# Patient Record
Sex: FEMALE | Race: WHITE
[De-identification: names, ages, dates, MRNs, and addresses within clinical notes are randomized per-mention and may not be internally consistent; named-entity substitution may affect disease eponyms.]

---

## 2020-02-24 ENCOUNTER — HOSPITAL ENCOUNTER (INPATIENT)
Dept: HOSPITAL 74 - FM/S | Age: 65
LOS: 2 days | Discharge: HOME HEALTH | DRG: 301 | End: 2020-02-26
Attending: ORTHOPAEDIC SURGERY | Admitting: ORTHOPAEDIC SURGERY
Payer: COMMERCIAL

## 2020-02-24 VITALS — BODY MASS INDEX: 22.8 KG/M2

## 2020-02-24 DIAGNOSIS — I10: ICD-10-CM

## 2020-02-24 DIAGNOSIS — M16.11: Primary | ICD-10-CM

## 2020-02-24 PROCEDURE — 8E0W0CZ ROBOTIC ASSISTED PROCEDURE OF TRUNK REGION, OPEN APPROACH: ICD-10-PCS | Performed by: ORTHOPAEDIC SURGERY

## 2020-02-24 PROCEDURE — 0SR90JZ REPLACEMENT OF RIGHT HIP JOINT WITH SYNTHETIC SUBSTITUTE, OPEN APPROACH: ICD-10-PCS | Performed by: ORTHOPAEDIC SURGERY

## 2020-02-24 RX ADMIN — KETOROLAC TROMETHAMINE SCH MG: 30 INJECTION INTRAMUSCULAR; INTRAVENOUS at 18:32

## 2020-02-24 RX ADMIN — ACETAMINOPHEN SCH MG: 325 TABLET ORAL at 21:28

## 2020-02-24 RX ADMIN — CEFAZOLIN SODIUM SCH MLS/HR: 2 SOLUTION INTRAVENOUS at 18:33

## 2020-02-24 RX ADMIN — KETOROLAC TROMETHAMINE SCH MG: 30 INJECTION INTRAMUSCULAR; INTRAVENOUS at 13:15

## 2020-02-24 RX ADMIN — DOCUSATE SODIUM,SENNOSIDES SCH TABLET: 50; 8.6 TABLET, FILM COATED ORAL at 21:28

## 2020-02-24 RX ADMIN — OXYCODONE HYDROCHLORIDE AND ACETAMINOPHEN SCH MG: 500 TABLET ORAL at 21:28

## 2020-02-24 RX ADMIN — CELECOXIB SCH MG: 200 CAPSULE ORAL at 21:29

## 2020-02-24 RX ADMIN — ACETAMINOPHEN SCH MG: 325 TABLET ORAL at 18:32

## 2020-02-24 RX ADMIN — ONDANSETRON PRN MG: 2 INJECTION INTRAMUSCULAR; INTRAVENOUS at 21:30

## 2020-02-24 RX ADMIN — GABAPENTIN SCH MG: 300 CAPSULE ORAL at 21:27

## 2020-02-24 NOTE — SPEC
DATE OF OPERATION:  02/24/2020

 

PREOPERATIVE DIAGNOSIS:  Right hip osteoarthritis.

 

POSTOPERATIVE DIAGNOSIS:  Right hip osteoarthritis.

 

PROCEDURE:  Right total hip replacement with MAKOplasty robotic navigation.

 

ATTENDING:  Mahendra Lemons MD 

 

ASSISTANT:  AGUEDA Schwartz 

 

ANESTHESIA:  Spinal plus sedation.

 

ESTIMATED BLOOD LOSS:  300 mL.

 

COMPLICATIONS:  None.

 

DISPOSITION:  The patient was transferred to the PACU in stable condition.

 

IMPLANTS USED:  Candy Accolade II size 4 femoral component, [place] Trident II

48-mm acetabular component with 25 and 20-mm acetabular screws, MDM bipolar head ball

and liner with inner +3-mm offset head ball. 

 

INDICATIONS:  This is a 64-year-old female who presented to the office complaining of

severe right hip pain.  She was seen and examined by Dr. Lemons and diagnosed with

severe right hip osteoarthritis.  The patient was initially treated conservatively

with injections, medications, and physical therapy but continued to have severe pain

and ambulatory dysfunction.  She was, therefore, indicated for a right total hip

replacement.  The risks, benefits, and alternatives to the procedure were explained

to the patient in great detail, and she elected to proceed with the surgery. 

 

DESCRIPTION OF PROCEDURE:  On the day of surgery, the patient was taken to the

operating room and placed on the OR table.  Spinal anesthesia was administered by the

anesthesiologist.  The patient was then positioned in the lateral decubitus position

on the table and all bony prominences were padded.  An axillary roll was placed.  The

operative hip was then prepped and draped in the usual sterile fashion and

intravenous antibiotics were given for infection prophylaxis.  A surgical time-out

was then performed with the team, and the patients identity, procedure, side,

availability of implants, and the administration of antibiotics were confirmed.  

 

An approximately 15-cm longitudinal incision was made through the skin centered on

the greater trochanter of the hip.  This dissection was carried down through the

subcutaneous tissues to the deep fascia.  This fascia was then incised and a Cobra

was placed around the inferior femoral neck.  Electrocautery was used to reflect the

anterior 40% of the gluteus medius and minimus starting at the musculotendinous

junction and leaving a cuff for closure.  This was reflected to reveal the capsule of

the hip joint.  An anterior capsulectomy was performed and the femoral head and neck

were visualized.  Grade 4 changes were noted diffusely throughout the joint.

 

At this point, three small stab incisions were made superior to the main incision

along the iliac crest.  Three self-drilling Steinmann pins were then placed and the

[employer] pelvic array was attached.   Reference points on the limb were then entered into

the robotic device and the limb length deficiency, offset, and femoral neck resection

level were then calculated by the software.  The hip was then dislocated with

traction and external rotation.  An oscillating saw was used to make the femoral neck

cut at the level previously templated, and the femoral head was removed. 

 

Attention was then turned to the acetabulum.  Retractors were then placed around the

acetabulum and the labrum was removed.  An acetabular checkpoint pin and the [employer]

software were used to register the contours of the acetabulum.  The acetabulum was

then reamed in a single stage to the preoperatively templated size using the [employer]

robotic arm.  The appropriately sized cup was then impacted and had solid fixation as

well as the preset inclination and version of 40 and 20 degrees, respectively.  A

polyethylene liner was then placed in the cup.

 

Attention was then turned back to the femur, which was externally rotated for

improved visualization. A femoral neck elevator was used to present the femoral neck

cut, a box osteotome was used to enter the femoral canal, and a canal finder was used

to go down the femoral shaft.  The Jamie broaches were used sequentially until the

optimal scratch fit was achieved.  This correlated with the preoperatively templated

size.  From here, several different offset head and neck configurations were tested

until excellent stability and length were obtained.  These measurements were

quantified using the [employer] software.

 

All trial components were then removed, the femur was copiously irrigated, and the

final components were placed.  After final implants were placed, a 3-minute dilute

Betadine soak was performed.  Following this, wound closure was begun.  Once wound

closure a completed and dressings were placed, a postoperative x-ray was taken

intraoperatively.  We noted on this x-ray that there was a foreign body present in

the soft tissue adjust to lesser trochanter of the hip.  Further analysis suggested

that this foreign body was a screw, and evaluation of the instrumentation used

revealed that a very small screw was missing from the JAMIE offset reamer handler and

that the other screws in the handle seemed to correspond to the screw seen on x-ray

in size and appearance.

 

Because there was a foreign body present and because the patient had not yet been

taken off the OR table and sent to the PACU, we elected to immediately reopen the

wound and retrieve the screw.  The patient was placed back in the lateral decubitus

position, and the right hip was prepped and draped in the usual sterile fashion after

dressings were removed.  From here, the previous closure was opened, and we opened

all layers down to the level of the initial acetabular prep.  From here, a C-arm was

used to localize the screw, and it was determined that it was in the vicinity of the

lesser trochanter and adductor musculature medial to the femur.  A finger sweep was

performed in this area, and we were able to easily retrieve the screw on the first

try.  The screw was then sent to be cleaned so that it could be returned to the

 for a full investigation.

 

Once the screw was retrieved, we again used the C-arm to take radiographs of the

right hip, and it was confirmed that no foreign bodies were still present.  The wound

was then thoroughly irrigated with normal saline via pulsatile lavage.  The saline

contained 3 g of Ancef in a 3-L bag.  Once this was completed, wound closure was

begun as per the earlier dictation.

 

Leg length and stability were checked again and found to be excellent.  Irrigation

was performed again.  Wound closure was started by repairing the abductor muscles

with a no. 2 FiberWire stitch in a Krackow configuration passed through bone tunnels

in the greater trochanter and tied over a bony bridge.  This repair was then

reinforced with a 0 V-Loc 180 barbed suture.  Next, no. 1 Polysorb and 0 V-Loc 180

were used to close the fascia.  The deep subcutaneous tissue was closed with no. 1

Polysorb sutures, and 2-0 Polysorb was used for the superficial subcutaneous tissue. 

The skin was closed using both 3-0 V-Loc 90 suture in a running subcuticular fashion

and SwiftSet skin adhesive.   The Jamie array and pins were removed from the iliac

crest and the stab incision sites were irrigated and closed with 4-0 Polysorb sutures

and SwiftSet skin adhesive.  Once this was completed, a sterile dressing was applied.

 The patient was then awakened and taken to the PACU in stable condition.

 

 

MAHENDRA LEMONS M.D.

 

[id number]

DD: 02/24/2020 13:11

DT: 02/24/2020 17:14

Job #:  [id number]

## 2020-02-24 NOTE — OP
Operative Note





- Note:


Operative Date: 02/24/20


Pre-Operative Diagnosis: Right hip OA


Operation: Right ROXANNA BRANNON


Post-Operative Diagnosis: Same as Pre-op


Surgeon: Hany Delcid


Assistant: Andreas Mccarty


Anesthesia: Spinal


Estimated Blood Loss (mls): 300

## 2020-02-24 NOTE — HP
Admitting History and Physical





- Admission


Chief Complaint: right hip osteoarthritis x years


History of Present Illness: 


64 year old female presents in regard to her right hip. Long-standing history 

of right hip osteoarthritis. Patient complains of pain, limited range of motion

, difficulty ambulating, and difficulty with activities of daily living. 

Patient has failed conservative treatment options including PO medications, 

activity modification, injections, and exercise programs. At this point, 

patient like to proceed with surgical intervention, right total hip 

arthroplasty MAKOplasty. 


History Source: Patient





- Past Medical History


Cardiovascular: Yes: HTN


Musculoskeletal: Yes: Osteoarthritis


Dermatology: Yes: Psoriasis





- Past Surgical History


Additional Past Surgical History: 


See written history & physical.








- Smoking History


Smoking history: Former smoker


Have you smoked in the past 12 months: No


If you are a former smoker, when did you quit?: 1987





- Alcohol/Substance Use


Hx Alcohol Use: Yes (RARE)





Home Medications





- Allergies


Allergies/Adverse Reactions: 


 Allergies











Allergy/AdvReac Type Severity Reaction Status Date / Time


 


No Known Drug Allergies Allergy   Verified 02/19/20 11:51














- Home Medications


Home Medications: 


Ambulatory Orders





Ixekizumab [Taltz Syringe] 80 mg SQ MONTHLY 02/19/20 


Lisinopril/Hydrochlorothiazide [Lisinopril-Hctz 20-12.5 mg Tab] 2 each PO DAILY 

02/19/20 











Review of Systems





- Review of Systems


Musculoskeletal: reports: Decreased ROM (right hip), Extremity Pain, Joint Pain 

(right hip)





Physical Examination


Vital Signs: 


 Vital Signs











Temperature  98.3 F   02/24/20 06:43


 


Pulse Rate  74   02/24/20 06:43


 


Respiratory Rate  18   02/24/20 06:43


 


Blood Pressure  140/80   02/24/20 06:43


 


O2 Sat by Pulse Oximetry (%)      











Constitutional: Yes: Well Nourished, No Distress


Eyes: Yes: Conjunctiva Clear


HENT: Yes: Atraumatic


Neck: Yes: Supple


Cardiovascular: Yes: Regular Rate and Rhythm


Respiratory: Yes: Regular


Gastrointestinal: Yes: Soft


...Rectal Exam: Yes: Deferred


Musculoskeletal: Yes: Joint Stiffness (right hip), Joint Swelling (right hip)





Assessment/Plan


64 year old female presents in regard to her right hip. Long-standing history 

of right hip osteoarthritis. Patient complains of pain, limited range of motion

, difficulty ambulating, and difficulty with activities of daily living. 

Patient has failed conservative treatment options including PO medications, 

activity modification, injections, and exercise programs. At this point, 

patient like to proceed with surgical intervention, right total hip 

arthroplasty MAKOplasty. Pros, cons, risks, benefits, and alternatives of a 

right total hip arthroplasty MAKOplasty were discussed with the patient at 

length. Patient confirms their understanding and consents to proceed with a 

right total hip arthroplasty MAKOplasty.

## 2020-02-25 LAB
ANION GAP SERPL CALC-SCNC: 7 MMOL/L (ref 8–16)
BUN SERPL-MCNC: 17 MG/DL (ref 7–18)
CALCIUM SERPL-MCNC: 8.5 MG/DL (ref 8.5–10)
CHLORIDE SERPL-SCNC: 98 MMOL/L (ref 98–107)
CO2 SERPL-SCNC: 26 MMOL/L (ref 21–32)
CREAT SERPL-MCNC: 0.6 MG/DL (ref 0.55–1.3)
DEPRECATED RDW RBC AUTO: 12.1 % (ref 11.6–15.6)
GLUCOSE SERPL-MCNC: 133 MG/DL (ref 74–106)
HCT VFR BLD CALC: 31.5 % (ref 32.4–45.2)
HGB BLD-MCNC: 11 GM/DL (ref 10.7–15.3)
MCH RBC QN AUTO: 31.4 PG (ref 25.7–33.7)
MCHC RBC AUTO-ENTMCNC: 34.7 G/DL (ref 32–36)
MCV RBC: 90.3 FL (ref 80–96)
PLATELET # BLD AUTO: 199 K/MM3 (ref 134–434)
PMV BLD: 8.6 FL (ref 7.5–11.1)
POTASSIUM SERPLBLD-SCNC: 3.7 MMOL/L (ref 3.5–5.1)
RBC # BLD AUTO: 3.49 M/MM3 (ref 3.6–5.2)
SODIUM SERPL-SCNC: 131 MMOL/L (ref 136–145)
WBC # BLD AUTO: 10.3 K/MM3 (ref 4–10.8)

## 2020-02-25 RX ADMIN — GABAPENTIN SCH MG: 300 CAPSULE ORAL at 21:35

## 2020-02-25 RX ADMIN — CELECOXIB SCH MG: 200 CAPSULE ORAL at 21:35

## 2020-02-25 RX ADMIN — OXYCODONE HYDROCHLORIDE AND ACETAMINOPHEN SCH MG: 500 TABLET ORAL at 21:35

## 2020-02-25 RX ADMIN — ACETAMINOPHEN SCH MG: 325 TABLET ORAL at 09:50

## 2020-02-25 RX ADMIN — ACETAMINOPHEN SCH MG: 325 TABLET ORAL at 21:34

## 2020-02-25 RX ADMIN — LISINOPRIL SCH MG: 20 TABLET ORAL at 10:00

## 2020-02-25 RX ADMIN — MULTIVITAMIN TABLET SCH TAB: TABLET at 10:05

## 2020-02-25 RX ADMIN — KETOROLAC TROMETHAMINE SCH MG: 30 INJECTION INTRAMUSCULAR; INTRAVENOUS at 02:05

## 2020-02-25 RX ADMIN — OXYCODONE HYDROCHLORIDE AND ACETAMINOPHEN SCH MG: 500 TABLET ORAL at 10:00

## 2020-02-25 RX ADMIN — PANTOPRAZOLE SODIUM SCH MG: 40 TABLET, DELAYED RELEASE ORAL at 10:00

## 2020-02-25 RX ADMIN — ACETAMINOPHEN SCH MG: 325 TABLET ORAL at 03:20

## 2020-02-25 RX ADMIN — ASPIRIN 325 MG ORAL TABLET SCH MG: 325 PILL ORAL at 08:00

## 2020-02-25 RX ADMIN — CEFAZOLIN SODIUM SCH MLS/HR: 2 SOLUTION INTRAVENOUS at 02:03

## 2020-02-25 RX ADMIN — GABAPENTIN SCH MG: 300 CAPSULE ORAL at 10:26

## 2020-02-25 RX ADMIN — ONDANSETRON PRN MG: 2 INJECTION INTRAMUSCULAR; INTRAVENOUS at 11:28

## 2020-02-25 RX ADMIN — DOCUSATE SODIUM,SENNOSIDES SCH TABLET: 50; 8.6 TABLET, FILM COATED ORAL at 10:00

## 2020-02-25 RX ADMIN — HYDROCHLOROTHIAZIDE SCH MG: 25 TABLET ORAL at 10:00

## 2020-02-25 RX ADMIN — CELECOXIB SCH MG: 200 CAPSULE ORAL at 10:00

## 2020-02-25 RX ADMIN — DOCUSATE SODIUM,SENNOSIDES SCH TABLET: 50; 8.6 TABLET, FILM COATED ORAL at 21:34

## 2020-02-25 NOTE — PN
Progress Note (short form)





- Note


Progress Note: 





64F POD1 s/p R THR under spinal anesthetic with peripheral nerve block for post 

operative pain relief.


Pt states that pain is well controlled and reports no anesthetic complications.


AVSS.


Continue current regimen.

## 2020-02-26 VITALS — TEMPERATURE: 98.2 F | DIASTOLIC BLOOD PRESSURE: 44 MMHG | SYSTOLIC BLOOD PRESSURE: 85 MMHG | HEART RATE: 62 BPM

## 2020-02-26 RX ADMIN — GABAPENTIN SCH MG: 300 CAPSULE ORAL at 09:13

## 2020-02-26 RX ADMIN — OXYCODONE HYDROCHLORIDE AND ACETAMINOPHEN SCH MG: 500 TABLET ORAL at 09:17

## 2020-02-26 RX ADMIN — ACETAMINOPHEN SCH: 325 TABLET ORAL at 05:54

## 2020-02-26 RX ADMIN — DOCUSATE SODIUM,SENNOSIDES SCH TABLET: 50; 8.6 TABLET, FILM COATED ORAL at 09:13

## 2020-02-26 RX ADMIN — MULTIVITAMIN TABLET SCH TAB: TABLET at 09:14

## 2020-02-26 RX ADMIN — PANTOPRAZOLE SODIUM SCH MG: 40 TABLET, DELAYED RELEASE ORAL at 09:14

## 2020-02-26 RX ADMIN — CELECOXIB SCH MG: 200 CAPSULE ORAL at 09:03

## 2020-02-26 RX ADMIN — HYDROCHLOROTHIAZIDE SCH: 25 TABLET ORAL at 09:13

## 2020-02-26 RX ADMIN — ASPIRIN 325 MG ORAL TABLET SCH MG: 325 PILL ORAL at 08:00

## 2020-02-26 RX ADMIN — LISINOPRIL SCH: 20 TABLET ORAL at 09:13

## 2020-02-26 RX ADMIN — ACETAMINOPHEN SCH MG: 325 TABLET ORAL at 09:15

## 2020-02-26 NOTE — DS
Physical Examination


Vital Signs: 


 Vital Signs











Temperature  98.2 F   02/26/20 01:37


 


Pulse Rate  62   02/26/20 01:37


 


Respiratory Rate  16   02/26/20 07:39


 


Blood Pressure  85/44 L  02/26/20 01:37


 


O2 Sat by Pulse Oximetry (%)  95   02/26/20 07:39











Labs: 


 CBC, BMP





 02/25/20 07:07 





 02/25/20 07:07 











Discharge Summary


Problems reviewed: Yes


Reason For Visit: UNIL PRIMARY OSTEOARTHRITIS RT HIP


Current Active Problems





Osteoarthritis of right hip (Acute)








Procedures: Principal: right ROXANNA BRANNON


Hospital Course: 


Admitted for elective surgery. Procedure performed without complications. 


Pt received postoperative antibiotic prophylaxis and DVT ppx.


Ambulated with physical therapy. Stable for discharge home with outpatient 

followup.


Condition: Stable





- Instructions


Diet, Activity, Other Instructions: 


Dr Delcid - Hip Replacement Instructions





Keep the Aquacel dressing on until removed by Dr. Delcid in 2 weeks - 


it is antibacterial and waterproof and you can shower with it on.





Call the office for a follow-up appointment with Dr. Delcid in 2 weeks. [phone]





Take one Aspirin 325mg daily for 6 weeks to prevent blood clots in your legs.





Take one Pantoprazole 40mg daily for 6 weeks to protect against heartburn and 

ulcers.





Take Cephalexin (antibiotic) 3x/day for 10 days to help prevent skin infection.





Take Celebrex 200mg twice daily for 30 days to reduce swelling and inflammation.





Take a multivitamin, stool softener and extra Vitamin C supplement daily.





For pain:





*Mild pain (1-3/10):





Take 1 Tramadol tablet every 4 hours as needed.





**Moderate pain (4-6/10):





Take 1 Tramadol tablet and 1 Percocet tablet every 4 hours as needed.





*** Severe pain (7-10/10):





Take 1 Tramadol tablet and 2 Percocet tablets every 4 hours as needed.





Activity: 


You can put as much weight on the operative leg as you want.





For the first 6 weeks, all you need to do is walk around the house, 


go up/down stairs, and sit down/get up. 





After 6 weeks when everything is healed (and bone has grown into the implant) 


you will be sent for more intensive outpatient physical therapy.





Always use a walker or cane for balance and to prevent falls.





Expect to see swelling / bruising from the operative site all the way down


to your toes.





Wear the Compression stocking on the operative side during the day to minimize 

how much


swelling there is in your foot/ankle.


Don't wear the stocking at night. You don't have to wear the stocking on the 

other side.


Disposition: VNS/HOME HEALTH CARE





- Home Medications


Comprehensive Discharge Medication List: 


Ambulatory Orders





Lisinopril/Hydrochlorothiazide [Lisinopril-Hctz 20-12.5 mg Tab] 2 each PO DAILY 

02/19/20 


Ascorbic Acid [Vitamin C -] 500 mg PO BID  tablet 02/26/20 


Aspirin [ASA -] 325 mg PO DAILY@0800  tablet 02/26/20 


Celecoxib [CeleBREX -] 200 mg PO BID #60 capsule 02/26/20 


Cephalexin Monohydrate [Keflex -] 500 mg PO TID #30 capsule 02/26/20 


Multivitamins [Multivit ([facility] Formul[facility])] 1 tab PO DAILY  tab 02/26/20 


Oxycodone HCl/Acetaminophen [Percocet 5-325 mg Tablet] 1 - 2 tab PO Q4H PRN #60 

tablet MDD 10 02/26/20 


Pantoprazole Sodium [Protonix -] 40 mg PO DAILY #40 tablet.ec 02/26/20 


Sennosides/Docusate Sodium [Pericolace -] 2 tablet PO BID  tablet 02/26/20 


traMADol HCL [Ultram -] 50 mg PO Q4H PRN #42 tablet MDD 6 02/26/20

## 2020-03-05 NOTE — PATH
Surgical Pathology Report



Patient Name:  JOANIE PEREZ

Accession #:  

Med. Rec. #:  [id number]                                                        

   /Age/Gender:  1955 (Age: 64) / F

Account:  [id number]                                                          

             Location: [facility]

Taken:  2020

Received:  2020

Reported:  3/5/2020

Physicians:  Hany Delcid M.D.

  



Specimen(s) Received

 RIGHT FEMORAL HEAD 





Clinical History

Right hip osteoarthritis







Final Diagnosis

FEMORAL HEAD, RIGHT, TOTAL HIP REPLACEMENT:

CARTILAGE CAPPED BONE WITH MARKED THINNING OF CARTILAGINOUS ARTICULAR SURFACE,

CONSISTENT WITH DEGENERATIVE JOINT DISEASE.

MARROW ELEMENTS WITH ACTIVE HEMATOPOIESIS (SEE NOTE).



Note: This case was sent to [facility], [place], [place] due to the presence of

an increased number of interstitial plasma cells in order to evaluate for plasma

cell dyscrasia. The above diagnosis was rendered there by Dr. Andreas Samaniego and

the following panel of immunohistochemical stains was performed: CD3, CD20,

pan-cytokeratin

(AE1:AE3), , MUM-1, kappa light chain & lambda light chain. The

pan-cytokeratin stain is negative for metastatic carcinoma. The CD3 stain shows

a normal population of interstitial marrow T-cells (10% of nucleated marrow

elements).

The immunohistochemical stain for CD20 is negative. The stains for MUM1, ,

kappa light chain & lambda light show a polyclonal/reactive/benign population

of interstitial. plasma cells (no sheets or infiltrates identified) comprising

<5% of nucleated marrow elements. There is no diagnostic evidence of a plasma

cell dyscrasia/neoplasm.

See also complete [facility] report ([id number]).





***Electronically Signed***

Pooja Sales M.D.





Gross Description

Received in formalin, labeled "right femoral head," is a 3.8 x 3.8 x 2.7 cm.

femoral head with a 1.3 cm in length portion of femoral neck attached. The

margin of resection is smooth. There is a 4.1 cm in greatest dimension area of

eburnation present. The remaining articular surface is tan-yellow and focally

nodular and granular. The underlying trabecular bone is yellow and hard. A

representative section is submitted in one cassette, following decalcification. 



/2020



saudi/[email or web address]